# Patient Record
Sex: MALE | NOT HISPANIC OR LATINO | Employment: FULL TIME | RURAL
[De-identification: names, ages, dates, MRNs, and addresses within clinical notes are randomized per-mention and may not be internally consistent; named-entity substitution may affect disease eponyms.]

---

## 2024-11-21 ENCOUNTER — TELEPHONE (OUTPATIENT)
Dept: CARDIOLOGY | Facility: CLINIC | Age: 54
End: 2024-11-21

## 2024-11-21 NOTE — TELEPHONE ENCOUNTER
REQUESTED PT'S SLEEP STUDY AND LABS THAT HE STATED HE DID 3 MONTHS AGO WITH DR CASON'S OFFICE TODAY 11.21.24 TO BE FAXED TO US BEFORE HIS VISIT NEXT TUESDAY THE 26TH OF NOV.

## 2024-11-26 ENCOUNTER — OFFICE VISIT (OUTPATIENT)
Dept: CARDIOLOGY | Facility: CLINIC | Age: 54
End: 2024-11-26
Payer: COMMERCIAL

## 2024-11-26 VITALS
HEIGHT: 70 IN | OXYGEN SATURATION: 94 % | SYSTOLIC BLOOD PRESSURE: 134 MMHG | DIASTOLIC BLOOD PRESSURE: 74 MMHG | BODY MASS INDEX: 34.79 KG/M2 | HEART RATE: 62 BPM | WEIGHT: 243 LBS

## 2024-11-26 DIAGNOSIS — G47.33 OSA (OBSTRUCTIVE SLEEP APNEA): Primary | ICD-10-CM

## 2024-11-26 DIAGNOSIS — I10 PRIMARY HYPERTENSION: ICD-10-CM

## 2024-11-26 DIAGNOSIS — E66.01 SEVERE OBESITY (BMI 35.0-39.9) WITH COMORBIDITY: ICD-10-CM

## 2024-11-26 DIAGNOSIS — G47.19 EXCESSIVE DAYTIME SLEEPINESS: ICD-10-CM

## 2024-11-26 RX ORDER — UBIDECARENONE 75 MG
CAPSULE ORAL
COMMUNITY
Start: 2024-11-15

## 2024-11-26 RX ORDER — ERGOCALCIFEROL 1.25 MG/1
CAPSULE, LIQUID FILLED ORAL
COMMUNITY
Start: 2024-11-15

## 2024-11-26 RX ORDER — PRAVASTATIN SODIUM 40 MG
TABLET ORAL
COMMUNITY
Start: 2024-11-15

## 2024-11-26 RX ORDER — LOSARTAN POTASSIUM 25 MG/1
TABLET ORAL
COMMUNITY
Start: 2024-11-15

## 2024-11-26 NOTE — PROGRESS NOTES
New Sleep Consult     Date:   2024  Name: Mukul Richards  :   1970  PCP: Mónica Campuzano MD    Chief Complaint   Patient presents with   • Establish Care     Neck size: 19.75in       Subjective     History of Present Illness  Mukul Richards is a 54 y.o. male who presents today for new patient evaluation for sleep.  He has been referred by his family physician Dr. Campuzano.  He had a positive home sleep test 2024 but has very limited sleep time of 2 hours.    Patient reports that he cannot get a full night sleep.  He can sleep a couple of hours and then he is awake and cannot fall back to sleep.  He reports that he rarely gets 6 hours of sleep.    Discussed with patient today that he needs further evaluation with an in lab sleep study and he is agreeable to proceed.    He works as a  for [email or web address]  He reports that his sleep schedule is irregular.  He reports that he works 2 to 3 days away from home and then he will be home for 1 day.    He reports that his average he will go to bed about 11 PM.  Get up around 9 AM.  Takes it at least an hour to fall asleep.  He will wake up 3 times during the night.  Minimum hours of sleep will be 1, average hours of sleep is 3 hours.  Maximum hours to 6 hours.    No specialty comments available.    Further details are as follows:    Neck Measurement: 19.75 inches    Mercer Scale is (out of 24):  14    Estimated average amount of sleep per night: 3  Number of times he wakes up at night: 3  Difficulty falling back asleep: yes  It usually takes 60 minutes to go to sleep.  He feels sleepy upon waking up: no  Rotating or night shift work: no    Drowsiness/Sleepiness:  He exhibits the following:  excessive daytime sleepiness  excessive daytime fatigue  falls asleep watching TV  falls asleep during times of the day when he is quiet    Snoring/Breathing:  He exhibits the following:  awakens with dry mouth, awakens gasping for breath, awakens with coughing,  "choking, and respiratory discomfort, and noted on the home sleep test that in the 2 hours recording time he had greater than 400 snores.  Patient is not aware that he snores.    Head Injury:  He exhibits the following:  No    Reflux:  He describes the following:  eats 2 meals daily     Narcolepsy:  He exhibits the following:  sudden episodes of sleep during the day    RLS/PLMs:  He describes the following:  none    Insomnia:  He describes the following:  problems initiating sleep at night  frequent awakenings  bothered by pain at night  restless sleep    Parasomnia:  He exhibits the following:  None    Weight:       11/26/24  1450   Weight: 110 kg (243 lb)      Weight change in the last 5 years:  gain: 40 lbs    The patient's relevant past medical, surgical, family, and social history reviewed and updated in Epic as appropriate.    Past Medical History:   Diagnosis Date   • Hyperlipidemia    • Hypotestosteronism    • Vitamin D deficiency      Past Surgical History:   Procedure Laterality Date   • CYST REMOVAL     • HERNIA REPAIR         No Known Allergies  Prior to Admission medications    Medication Sig Start Date End Date Taking? Authorizing Provider   Coenzyme Q10 (Co Q-10) 200 MG capsule  11/15/24  Yes Provider, MD Dominic   losartan (COZAAR) 25 MG tablet  11/15/24  Yes Provider, MD Dominic   pravastatin (PRAVACHOL) 40 MG tablet  11/15/24  Yes ProviderDominic MD   vitamin D (ERGOCALCIFEROL) 1.25 MG (21819 UT) capsule capsule  11/15/24  Yes Provider, MD Dominic     Family History   Problem Relation Age of Onset   • Hypertension Mother        Objective     Vital Signs:  /74 (BP Location: Right arm, Patient Position: Sitting, Cuff Size: Large Adult)   Pulse 62   Ht 176.5 cm (69.5\")   Wt 110 kg (243 lb)   SpO2 94%   BMI 35.37 kg/m²     Class 2 Severe Obesity (BMI >=35 and <=39.9). Obesity-related health conditions include the following: obstructive sleep apnea, hypertension, and " dyslipidemias. Obesity is worsening. BMI is is above average; BMI management plan is completed. We discussed low calorie, low carb based diet program, portion control, and pharmacologic options including Wegovy have been discussed.  Patient reports he has not started any medications and is not aware his insurance has approved any new medications to help with weight loss.  He has a hard time with exercise due to joint pain.  He agrees that he needs to lose weight for health improvement.        Physical Exam  Constitutional:       Appearance: Normal appearance. He is well-developed. He is obese.   HENT:      Head: Normocephalic and atraumatic.      Nose: Nose normal.      Mouth/Throat:      Mouth: Mucous membranes are moist.   Eyes:      General: No scleral icterus.     Pupils: Pupils are equal, round, and reactive to light.   Neck:      Vascular: No carotid bruit.   Cardiovascular:      Rate and Rhythm: Normal rate and regular rhythm.      Pulses: Normal pulses.           Radial pulses are 2+ on the right side and 2+ on the left side.        Dorsalis pedis pulses are 2+ on the right side and 2+ on the left side.        Posterior tibial pulses are 2+ on the right side and 2+ on the left side.      Heart sounds: Normal heart sounds. No murmur heard.  Pulmonary:      Effort: Pulmonary effort is normal.      Breath sounds: Normal breath sounds. No wheezing or rhonchi.   Abdominal:      General: Bowel sounds are normal.   Musculoskeletal:      Cervical back: Neck supple.      Right lower leg: No edema.      Left lower leg: No edema.   Skin:     General: Skin is warm and dry.      Capillary Refill: Capillary refill takes less than 2 seconds.      Coloration: Skin is not cyanotic.      Nails: There is no clubbing.   Neurological:      Mental Status: He is alert and oriented to person, place, and time.      Motor: No weakness.      Gait: Gait normal.   Psychiatric:         Mood and Affect: Mood normal.         Behavior:  Behavior normal. Behavior is cooperative.         Thought Content: Thought content normal.         Cognition and Memory: Memory normal.         The following data was reviewed by: AGUEDA Gomes on 11/26/2024:    PCP note 11/15/2024 from family physician Dr. Campuzano and home sleep study 5/6/2024          Assessment and Plan     Mukul Richards is a 54 y.o. male who presents for further evaluation of excessive daytime sleepiness and fatigue, nonrestorative sleep, and concerns for sleep disordered breathing and obstructive sleep apnea.  We will obtain testing for further evaluation.  The patient will return for follow-up and recommendations after test.  I have discussed weight loss as it pertains to obstructive sleep apnea.    Diagnoses and all orders for this visit:    1. HO (obstructive sleep apnea) (Primary)  Assessment & Plan:  He completed a home sleep test 5/6/2024    Baseline AHI was 7 and AHI increased to 16 in supine position.    Unfortunately there was only 2 hours of data on the home sleep test so this test was nondiagnostic.    Needs further evaluation due to limited hours of sleep on home sleep test 5/6/2024    Plan:    In lab sleep study for further evaluation and treatment    Follow-up after study to review the findings      2. Excessive daytime sleepiness  Assessment & Plan:  [place] Sleepiness Scale 14    Moderate chance of dozing while sitting and reading, sitting in active, laying down to rest or sitting quietly after lunch.  Moderate chance of dozing while watching television or as a passenger in a car.    He denies any excessive daytime sleepiness while driving or working.    Plan:    In-lab sleep study for further evaluation    Follow-up after study to review the findings      3. Severe obesity (BMI 35.0-39.9) with comorbidity  Assessment & Plan:  Class 2 Severe Obesity (BMI >=35 and <=39.9). Obesity-related health conditions include the following: obstructive sleep apnea, hypertension, and  dyslipidemias.     Obesity is worsening. BMI is is above average; BMI management plan is completed. We discussed low calorie, low carb based diet program, portion control, and pharmacologic options including Wegovy have been discussed.  Patient reports he has not started any medications and is not aware his insurance has approved any new medications to help with weight loss.      He has a hard time with exercise due to joint pain.      He agrees that he needs to lose weight for health improvement.    We discussed weight loss as it pertains to sleep apnea.      4. Primary hypertension  Assessment & Plan:  Patient has known history of hypertension.    We discussed untreated sleep apnea may potentiate hypertension.    Plan:    Continue to follow with PCP for hypertension and hypertension regimen.    Plan an in lab PSG for further evaluation and treatment of underlying sleep breathing disorder          I discussed the consequences of uncontrolled sleep apnea including hypertension, heart disease, diabetes, stroke, and dementia.    Report if any new/changing symptoms immediately, Sleep risks reviewed (driving, medical, sleep death, sedating agents), and Sleep hygiene discussed         Follow Up  Return in about 6 weeks (around 1/7/2025) for Follow-Up after studies.  Patient was given instructions and counseling regarding his condition or for health maintenance advice. Please see specific information pulled into the AVS if appropriate.

## 2024-11-26 NOTE — ASSESSMENT & PLAN NOTE
[place] Sleepiness Scale 14    Moderate chance of dozing while sitting and reading, sitting in active, laying down to rest or sitting quietly after lunch.  Moderate chance of dozing while watching television or as a passenger in a car.    He denies any excessive daytime sleepiness while driving or working.    Plan:    In-lab sleep study for further evaluation    Follow-up after study to review the findings

## 2024-11-26 NOTE — ASSESSMENT & PLAN NOTE
He completed a home sleep test 5/6/2024    Baseline AHI was 7 and AHI increased to 16 in supine position.    Unfortunately there was only 2 hours of data on the home sleep test so this test was nondiagnostic.    Needs further evaluation due to limited hours of sleep on home sleep test 5/6/2024    Plan:    In lab sleep study for further evaluation and treatment    Follow-up after study to review the findings

## 2024-11-26 NOTE — ASSESSMENT & PLAN NOTE
Patient has known history of hypertension.    We discussed untreated sleep apnea may potentiate hypertension.    Plan:    Continue to follow with PCP for hypertension and hypertension regimen.    Plan an in lab PSG for further evaluation and treatment of underlying sleep breathing disorder

## 2024-11-26 NOTE — ASSESSMENT & PLAN NOTE
Class 2 Severe Obesity (BMI >=35 and <=39.9). Obesity-related health conditions include the following: obstructive sleep apnea, hypertension, and dyslipidemias.     Obesity is worsening. BMI is is above average; BMI management plan is completed. We discussed low calorie, low carb based diet program, portion control, and pharmacologic options including Wegovy have been discussed.  Patient reports he has not started any medications and is not aware his insurance has approved any new medications to help with weight loss.      He has a hard time with exercise due to joint pain.      He agrees that he needs to lose weight for health improvement.    We discussed weight loss as it pertains to sleep apnea.

## 2024-12-11 ENCOUNTER — TELEPHONE (OUTPATIENT)
Dept: CARDIOLOGY | Facility: CLINIC | Age: 54
End: 2024-12-11
Payer: COMMERCIAL

## 2024-12-11 NOTE — TELEPHONE ENCOUNTER
LEFT A VM WITH PT ASKING THAT HE CALL ME BACK TO SEE IF HE IS WILLING TO RETEST ON A HOME SLEEP STUDY.  HUB OK TO RELAY AND SEND THE PHONE CALL TO ME DIRECTLY.  THANKS

## 2024-12-18 ENCOUNTER — TELEPHONE (OUTPATIENT)
Dept: CARDIOLOGY | Facility: CLINIC | Age: 54
End: 2024-12-18

## 2024-12-18 NOTE — TELEPHONE ENCOUNTER
Hub staff attempted to follow warm transfer process and was unsuccessful     Caller: ELLA BOYKIN    Relationship to patient: Emergency Contact    Best call back number: [phone]    Patient is needing: PT STATES THEY RECEIVED A CALL ABOUT SCHEDULING A SLEEP STUDY.

## 2024-12-19 DIAGNOSIS — G47.19 EXCESSIVE DAYTIME SLEEPINESS: Primary | ICD-10-CM
